# Patient Record
Sex: FEMALE | Race: WHITE
[De-identification: names, ages, dates, MRNs, and addresses within clinical notes are randomized per-mention and may not be internally consistent; named-entity substitution may affect disease eponyms.]

---

## 2019-07-18 ENCOUNTER — HOSPITAL ENCOUNTER (OUTPATIENT)
Dept: HOSPITAL 52 - LL.SDS | Age: 49
Discharge: NURSING, REHAB OR LONG-TERM CARE | End: 2019-07-18
Attending: SURGERY
Payer: COMMERCIAL

## 2019-07-18 ENCOUNTER — HOSPITAL ENCOUNTER (INPATIENT)
Dept: HOSPITAL 7 - FB.MS | Age: 49
LOS: 5 days | Discharge: HOME | End: 2019-07-23
Payer: COMMERCIAL

## 2019-07-18 DIAGNOSIS — K91.71: Primary | ICD-10-CM

## 2019-07-18 DIAGNOSIS — Z98.890: ICD-10-CM

## 2019-07-18 DIAGNOSIS — M79.641: ICD-10-CM

## 2019-07-18 DIAGNOSIS — E66.9: ICD-10-CM

## 2019-07-18 DIAGNOSIS — M79.642: ICD-10-CM

## 2019-07-18 DIAGNOSIS — Z87.891: ICD-10-CM

## 2019-07-18 DIAGNOSIS — E06.3: ICD-10-CM

## 2019-07-18 DIAGNOSIS — E78.00: ICD-10-CM

## 2019-07-18 DIAGNOSIS — Z79.1: ICD-10-CM

## 2019-07-18 DIAGNOSIS — E03.8: ICD-10-CM

## 2019-07-18 DIAGNOSIS — Z12.11: Primary | ICD-10-CM

## 2019-07-18 DIAGNOSIS — D12.0: ICD-10-CM

## 2019-07-18 DIAGNOSIS — Z79.899: ICD-10-CM

## 2019-07-18 LAB
CHLORIDE SERPL-SCNC: 106 MMOL/L (ref 98–107)
SODIUM SERPL-SCNC: 141 MMOL/L (ref 136–145)

## 2019-07-18 PROCEDURE — 45385 COLONOSCOPY W/LESION REMOVAL: CPT

## 2019-07-18 PROCEDURE — 80048 BASIC METABOLIC PNL TOTAL CA: CPT

## 2019-07-18 PROCEDURE — 85025 COMPLETE CBC W/AUTO DIFF WBC: CPT

## 2019-07-18 PROCEDURE — 36415 COLL VENOUS BLD VENIPUNCTURE: CPT

## 2019-07-18 PROCEDURE — 74019 RADEX ABDOMEN 2 VIEWS: CPT

## 2019-07-18 PROCEDURE — 0DQH0ZZ REPAIR CECUM, OPEN APPROACH: ICD-10-PCS

## 2019-07-18 NOTE — PCM.PN
- General Info


Date of Service: 07/18/19





- Review of Systems


Systems Review Comment:: 





49-year-old female referred for her initial screening colonoscopy. She denies 

any recent change in bowel pattern and also denies any family history of colon 

cancer. I have discussed the proposed colonoscopy with the patient. Risks such 

as but not limited to bleeding and GI injury reviewed. She agrees to proceed. 

Her recent history and physical is reviewed and no significant changes are 

noted.





- Patient Data


Vitals - Most Recent: 


 Last Vital Signs











Temp  96.8 F   07/18/19 11:37


 


Pulse  77   07/18/19 11:37


 


Resp  16   07/18/19 11:37


 


BP  122/85   07/18/19 11:37


 


Pulse Ox  97   07/18/19 11:37











Weight - Most Recent: 96.615 kg


Med Orders - Current: 


 Current Medications





Lactated Ringer's (Ringers, Lactated)  1,000 mls @ 125 mls/hr IV ASDIRECTED [facility]


   Last Admin: 07/18/19 11:42 Dose:  125 mls/hr


Sodium Chloride (Saline Flush)  10 ml FLUSH ASDIRECTED PRN


   PRN Reason: Keep Vein Open





Discontinued Medications





Propofol (Diprivan  20 Ml) Confirm Administered Dose 400 mg .ROUTE .STK-MED ONE


   Stop: 07/18/19 11:27











- Problem List Review


Problem List Initiated/Reviewed/Updated: Yes





- My Orders


Last 24 Hours: 


My Active Orders





07/18/19 11:30


Patient Status [ADT] Routine 


Peripheral IV Care [RC] .AS DIRECTED 


Verify Patient Consent Obtain [RC] ASDIRECTED 


Lactated Ringers [Ringers, Lactated] 1,000 ml IV ASDIRECTED 


Sodium Chloride 0.9% [Saline Flush]   10 ml FLUSH ASDIRECTED PRN 


Peripheral IV Insertion Adult [OM.PC] Routine 














- Assessment


Assessment:: 





Colon Cancer Screening





- Plan


Plan:: 





Colonoscopy

## 2019-07-18 NOTE — PCM.PN
- General Info


Date of Service: 07/18/19





- Review of Systems


Systems Review Comment:: 





See Dictated Note





- Patient Data


Vitals - Most Recent: 


 Last Vital Signs











Temp  96.7 F   07/18/19 14:30


 


Pulse  71   07/18/19 16:32


 


Resp  16   07/18/19 16:32


 


BP  94/63   07/18/19 16:32


 


Pulse Ox  100   07/18/19 16:32











Weight - Most Recent: 96.615 kg


I&O - Last 24 Hours: 


 Intake & Output











 07/18/19 07/18/19 07/18/19





 06:59 14:59 22:59


 


Output Total  0 


 


Balance  0 











Med Orders - Current: 


 Current Medications





Lactated Ringer's (Ringers, Lactated)  1,000 mls @ 125 mls/hr IV ASDIRECTED [facility]


   Last Admin: 07/18/19 18:02 Dose:  125 mls/hr


Morphine Sulfate (Morphine)  2 mg IVPUSH ONETIME ONE


   Stop: 07/18/19 18:07


Ondansetron HCl (Zofran)  4 mg IVPUSH ONETIME ONE


   Stop: 07/18/19 18:07


Sodium Chloride (Saline Flush)  10 ml FLUSH ASDIRECTED PRN


   PRN Reason: Keep Vein Open





Discontinued Medications





Ondansetron HCl (Zofran)  4 mg IVPUSH ONETIME ONE


   Stop: 07/18/19 14:18


   Last Admin: 07/18/19 14:21 Dose:  4 mg


Propofol (Diprivan  20 Ml) Confirm Administered Dose 400 mg .ROUTE .STK-MED ONE


   Stop: 07/18/19 11:27











- Problem List Review


Problem List Initiated/Reviewed/Updated: Yes





- My Orders


Last 24 Hours: 


My Active Orders





07/18/19 11:30


Patient Status [ADT] Routine 


Peripheral IV Care [RC] .AS DIRECTED 


Verify Patient Consent Obtain [RC] ASDIRECTED 


Lactated Ringers [Ringers, Lactated] 1,000 ml IV ASDIRECTED 


Sodium Chloride 0.9% [Saline Flush]   10 ml FLUSH ASDIRECTED PRN 


Peripheral IV Insertion Adult [OM.PC] Routine 





07/18/19 13:56


Ready for Discharge [RC] PER UNIT ROUTINE 





07/18/19 17:27


Abdomen 2V AP Flat Upright [CR] Routine 





07/18/19 18:06


Morphine   2 mg IVPUSH ONETIME ONE 


Ondansetron [Zofran]   4 mg IVPUSH ONETIME ONE 














- Assessment


Assessment:: 





Colon Perforation from Colonoscopy





- Plan


Plan:: 





Transfer to [facility] in [place] for Exploratory Laparotomy and repair of 

colon perforation.

## 2019-07-18 NOTE — OR
Date of Procedure:  07/18/2019

 

PREOPERATIVE DIAGNOSIS:  Colon cancer screening.

 

POSTOPERATIVE DIAGNOSIS:  Colon polyp.

 

OPERATION PERFORMED:  Colonoscopy with polypectomy.

 

INDICATIONS FOR SURGERY:  This 49-year-old female was referred for initial

screening colonoscopy.  She denies any recent change in bowel habits or history

of rectal bleeding.

 

FINDINGS:  The patient has a single sessile polyp noted in the cecum.  It is

approximately 2 cm in size.  It is soft.  The remainder of the colon lining

appeared normal.

 

DESCRIPTION OF PROCEDURE:  The patient was taken to the operating room.  She was

given intravenous sedation, and with her in the left lateral decubitus position,

digital rectal exam was performed showing no rectal masses.  The Olympus

colonoscope was inserted into the rectum.  Retroflexed examination of the rectal

canal was performed.  The scope was then carefully advanced under direct

visualization through the entire length of the colon until the cecum was

reached.  Cecal acquisition was confirmed by noting the normal internal cecal

anatomy including the appendiceal orifice and ileocecal valve.  In the cecum,

the above-described polyp was identified.  This was removed with a cautery

snare.  The polyp was able to be suctioned and retrieved into a polyp trap.

Careful inspection of the polypectomy site was carried out.  There was some

cautery artifact noted at the polypectomy site, but the site appeared to be

intact.  There was no indication of any bleeding or sign of any complication at

the polypectomy site.  The scope was then slowly withdrawn sequentially re-

examining the colonic segments until the entire colon and rectum have been fully

examined.  Attempt was made to remove as much air from the colon as possible.

The scope was removed and the patient was taken from the operating room in

satisfactory condition.

 

ESTIMATED BLOOD LOSS:  0.

 

COMPLICATIONS:  None.

 

PROGNOSIS:  Good.

 

RON Borrero MD

DD:  07/18/2019 15:39:43

DT:  07/18/2019 18:42:14

Job #:  [id number]

## 2019-07-18 NOTE — PCM.OPNOTE
- General Post-Op/Procedure Note


Date of Surgery/Procedure: 07/18/19


Operative Procedure(s): Closure of colon perforation


Findings: 





localized perforation in cecum with minimal surrounding inflammation and 

minimal intraperitoneal contamination


Pre Op Diagnosis: Colon Perforation


Post-Op Diagnosis: Same


Anesthesia Technique: General ET Tube


Primary Surgeon: Brendan Borrero


Assistant: Mohs,Thomas J


Reason Assistant Was Necessary: 





Assist in exposure and tissue control and to improve efficiency


Pathology: 





Perforation site of cecum


EBL in mLs: 50


Complications: None


Condition: Good

## 2019-07-18 NOTE — PCM.OPNOTE
- General Post-Op/Procedure Note


Date of Surgery/Procedure: 07/18/19


Operative Procedure(s): Colonoscopy with Polypectomy


Findings: 





2 cm Cecal Polyp


Pre Op Diagnosis: Colon cancer screening


Post-Op Diagnosis: Colonoscopy with polypectomy


Anesthesia Technique: MAC


Primary Surgeon: Brendan Borrero


Pathology: 





Cecal polyp


Output, Urine Amount: 0


EBL in mLs: 0


Complications: None


Condition: Good

## 2019-07-19 RX ADMIN — KETOROLAC TROMETHAMINE SCH MG: 30 INJECTION, SOLUTION INTRAMUSCULAR at 16:45

## 2019-07-19 RX ADMIN — Medication PRN ML: at 16:51

## 2019-07-19 RX ADMIN — KETOROLAC TROMETHAMINE SCH MG: 30 INJECTION, SOLUTION INTRAMUSCULAR at 23:06

## 2019-07-19 NOTE — OR
DATE OF OPERATION:  07/18/2019

 

SURGEON:  Brendan Borrero MD

 

FIRST ASSISTANT:  Dr. Thomas Mohs.  Assistant needed to assist in exposure and

tissue manipulation as well as improve efficiency.

 

PREOPERATIVE DIAGNOSIS:  Colon perforation post colonoscopy.

 

POSTOPERATIVE DIAGNOSIS:  Colon perforation post colonoscopy.

 

OPERATION PERFORMED:  Exploratory laparotomy with closure of colon perforation.

 

INDICATIONS FOR SURGERY:  This 49-year-old female underwent a screening

colonoscopy earlier today.  She had a large cecal polyp, which was removed with

a cautery snare.  No other abnormalities were noted during her exam.

Postoperatively, she had symptoms of nausea and abdominal pain, and a

postprocedure x-ray revealed free intraperitoneal air indicating colon

perforation.

 

FINDINGS:  In the patient's cecum on the antimesenteric border, the patient had

a small localized perforation.  There was

minimal inflammation noted in the area and a minimal amount of intraabdominal

contamination.  The adjacent cecum all appeared normal.

 

PROCEDURE IN DETAIL:  The patient was taken to the operating room.  She was

given general endotracheal anesthesia.  The abdomen was sterilely prepped and

draped.  A vertical midline abdominal incision was made and carried through the

midline of the fascia into the peritoneal cavity.  Gentle manipulation isolated

the cecum, which was able to be brought up into and clearly visualized through

the midline incision, the site of the perforation was identified.  The edges of

the perforation were secured with care being used to assure that the mucosa was

secured in the Allis clamps.  The perforation was sealed with Allis clamps

transversely and then a TA-55 stapler was fired across the site of the

perforation closing it.  The excess tissue from the clamp was sharply excised,

which included the edges of the perforation and this tissue was submitted for

pathologic evaluation.  The staple line was carefully inspected, appeared to be

intact and without any complication.  There was no indication of any stenosis of

the cecum, and the staple line was well away from the ileocecal valve and the

appendix.  Interrupted 3-0 Vicryl inverting sutures were then used to invert the

staple line giving additional reinforcement to this closure site.  Inspection of

the remainder of the bowel in this region showed no sign of any injury and then

copious irrigation with multiple liters of saline was carried out irrigating all

quadrants of the abdominal cavity, clearing any small amount of contamination.

Once the irrigant was returning clear, the wound was closed approximating the

midline fascia with running #2 Prolene, and after copiously irrigating the

subcutaneous layer, the skin was closed with skin staples.  Sterile dressing was

placed.  The patient was awakened, extubated, and taken from the operating room

in satisfactory condition.

 

ESTIMATED BLOOD LOSS:  50 mL.

 

COMPLICATIONS:  None.

 

PROGNOSIS:  Good.

 

Job#: [id number]

DD: 07/18/2019 2211

DT: 07/19/2019 0207 JAX/RON MCCOY

## 2019-07-19 NOTE — PCM.SURGPN
- General Info


Date of Service: 07/19/19


Date of Surgery/Procedure: 07/18/19


POD#: 1


Post-Op Diagnosis: Colon Perforation


Functional Status: Reports: Pain Controlled (Only incisional pain, denies pain 

in abdomen)





- Review of Systems


Pulmonary: Reports: No Symptoms


Gastrointestinal: Denies: Flatus, Nausea, Vomiting


Musculoskeletal: Denies: Leg Pain





- Patient Data


Vitals - Most Recent: 


 Last Vital Signs











Temp  99.0 F   07/19/19 02:25


 


Pulse  68   07/19/19 02:25


 


Resp  17   07/19/19 02:25


 


BP  114/77   07/19/19 02:25


 


Pulse Ox  95   07/19/19 03:09











Weight - Most Recent: 220 lb 8 oz


I&O - Last 24 Hours: 


 Intake & Output











 07/18/19 07/19/19 07/19/19





 22:59 06:59 14:59


 


Intake Total  858 


 


Output Total 400  


 


Balance -400 858 











Lab Results Last 24 Hrs: 


 Laboratory Results - last 24 hr











  07/19/19 07/19/19 Range/Units





  06:15 06:15 


 


WBC  17.5 H   (4.5-12.0)  X10-3/uL


 


RBC  4.66   (3.23-5.20)  x10(6)uL


 


Hgb  14.4   (11.5-15.5)  g/dL


 


Hct  42.8   (30.0-51.3)  %


 


MCV  91.9   (80-96)  fL


 


MCH  30.8   (27.7-33.6)  pg


 


MCHC  33.5   (32.2-35.4)  g/dL


 


RDW  12.4   (11.5-15.5)  %


 


Plt Count  217   (125-369)  X10(3)uL


 


MPV  11.5 H   (7.4-10.4)  fL


 


Add Manual Diff  Yes   


 


Neutrophils % (Manual)  90 H   (46-82)  %


 


Band Neutrophils %  4   (0-6)  %


 


Lymphocytes % (Manual)  3 L   (13-37)  %


 


Monocytes % (Manual)  3 L   (4-12)  %


 


Sodium   142  (135-145)  mmol/L


 


Potassium   4.0  (3.5-5.3)  mmol/L


 


Chloride   108  (100-110)  mmol/L


 


Carbon Dioxide   25  (21-32)  mmol/L


 


BUN   9  (7-18)  mg/dL


 


Creatinine   1.0  (0.55-1.02)  mg/dL


 


Est Cr Clr Drug Dosing   60.56  mL/min


 


Estimated GFR (MDRD)   59 L  (>60)  


 


BUN/Creatinine Ratio   9.0  (9-20)  


 


Glucose   119 H  ()  mg/dL


 


Calcium   8.2 L  (8.6-10.2)  mg/dL


 


Total Bilirubin   0.5  (0.1-1.3)  mg/dL


 


AST   13  (5-25)  IU/L


 


ALT   20  (12-36)  U/L


 


Alkaline Phosphatase   86  ()  IU/L


 


Total Protein   5.9 L  (6.0-8.0)  g/dL


 


Albumin   2.8 L  (3.5-5.2)  g/dL


 


Globulin   3.1  g/dL


 


Albumin/Globulin Ratio   0.9  











Med Orders - Current: 


 Current Medications





Enoxaparin Sodium (Lovenox)  30 mg SUBCUT DAILY [facility]


Lactated Ringer's (Ringers, Lactated)  1,000 mls @ 125 mls/hr IV ASDIRECTED [facility]


   Last Admin: 07/19/19 02:43 Dose:  125 mls/hr


Piperacillin Sod/Tazobactam (Sod 3.375 gm/ Sodium Chloride)  50 mls @ 100 mls/

hr IV Q6H [facility]


   Last Admin: 07/19/19 01:51 Dose:  100 mls/hr


Lactated Ringer's (Ringers, Lactated)  1,000 mls @ 125 mls/hr IV ASDIRECTED [facility]


Ketorolac Tromethamine (Toradol)  30 mg IVPUSH Q6H [facility]


Morphine Sulfate (Morphine)  2 mg IVPUSH Q1H PRN


   PRN Reason: Pain (severe 7-10)


   Last Admin: 07/19/19 05:59 Dose:  2 mg


Morphine Sulfate (Morphine Pca 30 Mg In 30 Ml)  0 mg IV ASDIRECTED [facility]; Protocol


Ondansetron HCl (Zofran)  4 mg IVPUSH Q6H PRN


   PRN Reason: Nausea/Vomiting





Discontinued Medications





Sodium Chloride (Normal Saline)  250 mls @ 250 mls/hr IV ASDIRECTED [facility]


   Stop: 07/18/19 20:59


   Last Admin: 07/18/19 20:29 Dose:  250 mls/hr


Piperacillin Sod/Tazobactam (Sod 3.375 gm/ Sodium Chloride)  50 mls @ 100 mls/

hr IV Q6H [facility]


   Last Admin: 07/18/19 23:16 Dose:  Not Given


Piperacillin Sod/Tazobactam Sod (Zosyn) Confirm Administered Dose 3.375 gm 

.ROUTE .STK-MED ONE


   Stop: 07/18/19 20:13


   Last Admin: 07/18/19 20:18 Dose:  Not Given











- Exam


Wound/Incisions: Dressing Dry and Intact


General: Alert, Oriented


Lungs: Normal Respiratory Effort


GI/Abdominal Exam: Soft, No Distention, Tender (only near incision)


Extremities: Non-Tender





- Problem List Review


Problem List Initiated/Reviewed/Updated: Yes





- My Orders


Last 24 Hours: 


 Active Orders 24 hr











 Category Date Time Status


 


 Patient Status [ADT] Routine ADT  07/18/19 19:58 Active


 


 Patient Status [ADT] Routine ADT  07/18/19 21:51 Active


 


 Ambulate [RC] ASDIRECTED Care  07/18/19 21:51 Active


 


 Antiembolic Devices [RC] .Routine Care  07/18/19 21:58 Active


 


 DC Castro Catheter [Urinary Catheter Removal] [RC] Per Care  07/19/19 07:12 

Ordered





 Unit Routine   


 


 Insert Castro Catheter [Insert Urinary Catheter] [OM.PC] Care  07/18/19 20:15 

Ordered





 Q24H   


 


 Intake and Output [RC] QSHIFT Care  07/18/19 21:52 Active


 


 Oxygen Therapy [RC] PRN Care  07/18/19 21:51 Active


 


 RT Incentive Spirometry [RC] Q1HWA Care  07/18/19 21:51 Active


 


 VTE/DVT Education [RC] Click to Edit Care  07/18/19 21:58 Active


 


 Vital Signs [RC] PER UNIT ROUTINE Care  07/18/19 21:51 Active


 


 Nothing Per Oral Diet [DIET] Diet  07/18/19 Dinner Active


 


 CBC WITH MANUAL DIFF [HEME] AM Lab  07/20/19 05:11 Ordered


 


 Enoxaparin [Lovenox] Med  07/19/19 09:00 Ordered





 30 mg SUBCUT DAILY   


 


 Ketorolac [Toradol] Med  07/19/19 07:15 Ordered





 30 mg IVPUSH Q6H   


 


 Lactated Ringers [Ringers, Lactated] 1,000 ml Med  07/18/19 20:00 Active





 IV ASDIRECTED   


 


 Lactated Ringers [Ringers, Lactated] 1,000 ml Med  07/18/19 22:00 Active





 IV ASDIRECTED   


 


 Morphine Med  07/18/19 21:51 Active





 2 mg IVPUSH Q1H PRN   


 


 Morphine PF [Morphine PCA 30 MG in 30 ML] Med  07/18/19 22:00 Active





 See Protocol  IV ASDIRECTED   


 


 Ondansetron [Zofran] Med  07/18/19 21:58 Active





 4 mg IVPUSH Q6H PRN   


 


 Piperacillin/Tazobactam [Zosyn] 3.375 gm Med  07/18/19 20:00 Active





 Sodium Chloride 0.9% [Normal Saline] 50 ml   





 IV Q6H   


 


 DVT/VTE Prophylaxis Reflex [OM.PC] Per Unit Routine Oth  07/18/19 21:58 Ordered


 


 Sequential Compression Device [OM.PC] Routine Oth  07/18/19 19:58 Ordered


 


 Resuscitation Status Routine Resus Stat  07/18/19 19:58 Ordered








 Medication Orders





Enoxaparin Sodium (Lovenox)  30 mg SUBCUT DAILY [facility]


Lactated Ringer's (Ringers, Lactated)  1,000 mls @ 125 mls/hr IV ASDIRECTED [facility]


   Last Admin: 07/19/19 02:43  Dose: 125 mls/hr


Piperacillin Sod/Tazobactam (Sod 3.375 gm/ Sodium Chloride)  50 mls @ 100 mls/

hr IV Q6H [facility]


   Last Admin: 07/19/19 01:51  Dose: 100 mls/hr


   Admin: 07/18/19 20:29  Dose: 100 mls/hr


Lactated Ringer's (Ringers, Lactated)  1,000 mls @ 125 mls/hr IV ASDIRECTED [facility]


Ketorolac Tromethamine (Toradol)  30 mg IVPUSH Q6H [facility]


Morphine Sulfate (Morphine)  2 mg IVPUSH Q1H PRN


   PRN Reason: Pain (severe 7-10)


   Last Admin: 07/19/19 05:59  Dose: 2 mg


Morphine Sulfate (Morphine Pca 30 Mg In 30 Ml)  0 mg IV ASDIRECTED [facility]; Protocol


Ondansetron HCl (Zofran)  4 mg IVPUSH Q6H PRN


   PRN Reason: Nausea/Vomiting











- Assessment


Assessment           (Free Text/Narrative):: 





POD#1 Repair of Colon Perforation - stable


WBC remain elevated





- Plan


Plan                        (Free Text/Narrative):: 





DC Castro


Activity encouraged


continue NPO except ice chips


continue IV antibiotics


start Torodal and Lovenox

## 2019-07-19 NOTE — HP
ADMISSION DATE:  07/18/2019

 

This 49-year-old female underwent colonoscopy earlier today in [place].  At that

time, a polyp was removed from her cecum.  Her colon otherwise appeared normal.

Postoperatively, she continued to have abdominal pain and postprocedure

abdominal x-ray has revealed free intraperitoneal air indicating a perforation

likely at the polypectomy site.  I have recommended exploratory laparotomy for

closure of this colon perforation.  This has been discussed with the patient and

her family.  We reviewed options of treatment and location, and she has agreed

to undergo this laparotomy at the [facility] for repair of this colon

perforation.  She has been advised of the risks and agrees to proceed

understanding these.

 

Job#: [id number]

DD: 07/18/2019 2051

DT: 07/19/2019 0101 JAX/RON

## 2019-07-19 NOTE — PCM.SURGPN
- General Info


Date of Service: 07/19/19


Date of Surgery/Procedure: 07/18/19


POD#: 1


Post-Op Diagnosis: Colon Perforation


Functional Status: Reports: Pain Controlled (only notes tightness in area of 

incision)





- Review of Systems


Pulmonary: Denies: Shortness of Breath


Gastrointestinal: Denies: Flatus


Genitourinary: Reports: Other (able to void without dobbins)





- Patient Data


Vitals - Most Recent: 


 Last Vital Signs











Temp  99.0 F   07/19/19 02:25


 


Pulse  68   07/19/19 02:25


 


Resp  17   07/19/19 02:25


 


BP  114/77   07/19/19 02:25


 


Pulse Ox  100   07/19/19 10:50











Weight - Most Recent: 220 lb 8 oz


I&O - Last 24 Hours: 


 Intake & Output











 07/19/19 07/19/19 07/19/19





 06:59 14:59 22:59


 


Intake Total 858  


 


Balance 858  











Lab Results Last 24 Hrs: 


 Laboratory Results - last 24 hr











  07/19/19 07/19/19 Range/Units





  06:15 06:15 


 


WBC  17.5 H   (4.5-12.0)  X10-3/uL


 


RBC  4.66   (3.23-5.20)  x10(6)uL


 


Hgb  14.4   (11.5-15.5)  g/dL


 


Hct  42.8   (30.0-51.3)  %


 


MCV  91.9   (80-96)  fL


 


MCH  30.8   (27.7-33.6)  pg


 


MCHC  33.5   (32.2-35.4)  g/dL


 


RDW  12.4   (11.5-15.5)  %


 


Plt Count  217   (125-369)  X10(3)uL


 


MPV  11.5 H   (7.4-10.4)  fL


 


Add Manual Diff  Yes   


 


Neutrophils % (Manual)  90 H   (46-82)  %


 


Band Neutrophils %  4   (0-6)  %


 


Lymphocytes % (Manual)  3 L   (13-37)  %


 


Monocytes % (Manual)  3 L   (4-12)  %


 


Sodium   142  (135-145)  mmol/L


 


Potassium   4.0  (3.5-5.3)  mmol/L


 


Chloride   108  (100-110)  mmol/L


 


Carbon Dioxide   25  (21-32)  mmol/L


 


BUN   9  (7-18)  mg/dL


 


Creatinine   1.0  (0.55-1.02)  mg/dL


 


Est Cr Clr Drug Dosing   60.56  mL/min


 


Estimated GFR (MDRD)   59 L  (>60)  


 


BUN/Creatinine Ratio   9.0  (9-20)  


 


Glucose   119 H  ()  mg/dL


 


Calcium   8.2 L  (8.6-10.2)  mg/dL


 


Total Bilirubin   0.5  (0.1-1.3)  mg/dL


 


AST   13  (5-25)  IU/L


 


ALT   20  (12-36)  U/L


 


Alkaline Phosphatase   86  ()  IU/L


 


Total Protein   5.9 L  (6.0-8.0)  g/dL


 


Albumin   2.8 L  (3.5-5.2)  g/dL


 


Globulin   3.1  g/dL


 


Albumin/Globulin Ratio   0.9  











Med Orders - Current: 


 Current Medications





Enoxaparin Sodium (Lovenox)  30 mg SUBCUT DAILY [facility]


   Last Admin: 07/19/19 09:50 Dose:  30 mg


Lactated Ringer's (Ringers, Lactated)  1,000 mls @ 125 mls/hr IV ASDIRECTED [facility]


   Last Admin: 07/19/19 09:44 Dose:  125 mls/hr


Piperacillin Sod/Tazobactam (Sod 3.375 gm/ Sodium Chloride)  50 mls @ 100 mls/

hr IV Q6H [facility]


   Last Admin: 07/19/19 14:12 Dose:  100 mls/hr


Lactated Ringer's (Ringers, Lactated)  1,000 mls @ 125 mls/hr IV ASDIRECTED [facility]


Ketorolac Tromethamine (Toradol)  30 mg IVPUSH Q6H [facility]


   Stop: 07/24/19 04:01


Morphine Sulfate (Morphine)  2 mg IVPUSH Q1H PRN


   PRN Reason: Pain (severe 7-10)


   Last Admin: 07/19/19 05:59 Dose:  2 mg


Morphine Sulfate (Morphine Pca 30 Mg In 30 Ml)  0 mg IV ASDIRECTED [facility]; Protocol


Ondansetron HCl (Zofran)  4 mg IVPUSH Q6H PRN


   PRN Reason: Nausea/Vomiting


Sodium Chloride (Saline Flush)  10 ml FLUSH ASDIRECTED PRN


   PRN Reason: IV Use





Discontinued Medications





Sodium Chloride (Normal Saline)  250 mls @ 250 mls/hr IV ASDIRECTED [facility]


   Stop: 07/18/19 20:59


   Last Admin: 07/18/19 20:29 Dose:  250 mls/hr


Piperacillin Sod/Tazobactam (Sod 3.375 gm/ Sodium Chloride)  50 mls @ 100 mls/

hr IV Q6H [facility]


   Last Admin: 07/18/19 23:16 Dose:  Not Given


Ketorolac Tromethamine (Toradol)  30 mg IVPUSH Q6H [facility]


   Stop: 07/24/19 01:31


   Last Admin: 07/19/19 09:50 Dose:  30 mg


Piperacillin Sod/Tazobactam Sod (Zosyn) Confirm Administered Dose 3.375 gm 

.ROUTE .STK-MED ONE


   Stop: 07/18/19 20:13


   Last Admin: 07/18/19 20:18 Dose:  Not Given











- Problem List Review


Problem List Initiated/Reviewed/Updated: Yes





- My Orders


Last 24 Hours: 


 Active Orders 24 hr











 Category Date Time Status


 


 Patient Status [ADT] Routine ADT  07/18/19 19:58 Active


 


 Patient Status [ADT] Routine ADT  07/18/19 21:51 Active


 


 Ambulate [RC] ASDIRECTED Care  07/18/19 21:51 Active


 


 Antiembolic Devices [RC] .Routine Care  07/18/19 21:58 Active


 


 DC Dobbins Catheter [Urinary Catheter Removal] [RC] Per Care  07/19/19 07:12 

Active





 Unit Routine   


 


 Insert Dobbins Catheter [Insert Urinary Catheter] [OM.PC] Care  07/18/19 20:15 

Ordered





 Q24H   


 


 Intake and Output [RC] QSHIFT Care  07/18/19 21:52 Active


 


 Oxygen Therapy [RC] PRN Care  07/18/19 21:51 Active


 


 RT Incentive Spirometry [RC] Q1HWA Care  07/18/19 21:51 Active


 


 VTE/DVT Education [RC] Click to Edit Care  07/18/19 21:58 Active


 


 Vital Signs [RC] PER UNIT ROUTINE Care  07/18/19 21:51 Active


 


 Nothing Per Oral Diet [DIET] Diet  07/18/19 Dinner Active


 


 CBC WITH MANUAL DIFF [HEME] AM Lab  07/20/19 05:11 Ordered


 


 Enoxaparin [Lovenox] Med  07/19/19 09:00 Active





 30 mg SUBCUT DAILY   


 


 Ketorolac [Toradol] Med  07/19/19 16:00 Active





 30 mg IVPUSH Q6H   


 


 Lactated Ringers [Ringers, Lactated] 1,000 ml Med  07/18/19 20:00 Active





 IV ASDIRECTED   


 


 Lactated Ringers [Ringers, Lactated] 1,000 ml Med  07/18/19 22:00 Active





 IV ASDIRECTED   


 


 Morphine Med  07/18/19 21:51 Active





 2 mg IVPUSH Q1H PRN   


 


 Morphine PF [Morphine PCA 30 MG in 30 ML] Med  07/18/19 22:00 Active





 See Protocol  IV ASDIRECTED   


 


 Ondansetron [Zofran] Med  07/18/19 21:58 Active





 4 mg IVPUSH Q6H PRN   


 


 Piperacillin/Tazobactam [Zosyn] 3.375 gm Med  07/18/19 20:00 Active





 Sodium Chloride 0.9% [Normal Saline] 50 ml   





 IV Q6H   


 


 Sodium Chloride 0.9% [Saline Flush] Med  07/19/19 14:51 Active





 10 ml FLUSH ASDIRECTED PRN   


 


 DVT/VTE Prophylaxis Reflex [OM.PC] Per Unit Routine Oth  07/18/19 21:58 Ordered


 


 Sequential Compression Device [OM.PC] Routine Oth  07/18/19 19:58 Ordered


 


 Resuscitation Status Routine Resus Stat  07/18/19 19:58 Ordered








 Medication Orders





Enoxaparin Sodium (Lovenox)  30 mg SUBCUT DAILY [facility]


   Last Admin: 07/19/19 09:50  Dose: 30 mg


Lactated Ringer's (Ringers, Lactated)  1,000 mls @ 125 mls/hr IV ASDIRECTED [facility]


   Last Admin: 07/19/19 09:44  Dose: 125 mls/hr


   Infusion: 07/19/19 09:44  Dose: 125 mls/hr


   Admin: 07/19/19 02:43  Dose: 125 mls/hr


Piperacillin Sod/Tazobactam (Sod 3.375 gm/ Sodium Chloride)  50 mls @ 100 mls/

hr IV Q6H [facility]


   Last Admin: 07/19/19 14:12  Dose: 100 mls/hr


   Admin: 07/19/19 08:29  Dose: 100 mls/hr


   Admin: 07/19/19 01:51  Dose: 100 mls/hr


   Admin: 07/18/19 20:29  Dose: 100 mls/hr


Lactated Ringer's (Ringers, Lactated)  1,000 mls @ 125 mls/hr IV ASDIRECTED [facility]


Ketorolac Tromethamine (Toradol)  30 mg IVPUSH Q6H [facility]


   Stop: 07/24/19 04:01


Morphine Sulfate (Morphine)  2 mg IVPUSH Q1H PRN


   PRN Reason: Pain (severe 7-10)


   Last Admin: 07/19/19 05:59  Dose: 2 mg


Morphine Sulfate (Morphine Pca 30 Mg In 30 Ml)  0 mg IV ASDIRECTED [facility]; Protocol


Ondansetron HCl (Zofran)  4 mg IVPUSH Q6H PRN


   PRN Reason: Nausea/Vomiting


Sodium Chloride (Saline Flush)  10 ml FLUSH ASDIRECTED PRN


   PRN Reason: IV Use











- Assessment


Assessment           (Free Text/Narrative):: 





POD#1 repair of colon perforation





- Plan


Plan                        (Free Text/Narrative):: 





Continue current care


Encourage deep breathing and ambulation

## 2019-07-20 RX ADMIN — KETOROLAC TROMETHAMINE SCH MG: 30 INJECTION, SOLUTION INTRAMUSCULAR at 03:46

## 2019-07-20 RX ADMIN — KETOROLAC TROMETHAMINE SCH MG: 30 INJECTION, SOLUTION INTRAMUSCULAR at 16:42

## 2019-07-20 RX ADMIN — KETOROLAC TROMETHAMINE SCH MG: 30 INJECTION, SOLUTION INTRAMUSCULAR at 10:09

## 2019-07-20 RX ADMIN — KETOROLAC TROMETHAMINE SCH MG: 30 INJECTION, SOLUTION INTRAMUSCULAR at 21:18

## 2019-07-20 RX ADMIN — Medication PRN ML: at 07:45

## 2019-07-20 RX ADMIN — Medication PRN ML: at 14:37

## 2019-07-21 RX ADMIN — KETOROLAC TROMETHAMINE SCH MG: 30 INJECTION, SOLUTION INTRAMUSCULAR at 21:37

## 2019-07-21 RX ADMIN — Medication PRN ML: at 20:13

## 2019-07-21 RX ADMIN — KETOROLAC TROMETHAMINE SCH MG: 30 INJECTION, SOLUTION INTRAMUSCULAR at 16:19

## 2019-07-21 RX ADMIN — Medication PRN ML: at 14:23

## 2019-07-21 RX ADMIN — Medication PRN ML: at 07:35

## 2019-07-21 RX ADMIN — KETOROLAC TROMETHAMINE SCH MG: 30 INJECTION, SOLUTION INTRAMUSCULAR at 09:33

## 2019-07-21 RX ADMIN — KETOROLAC TROMETHAMINE SCH MG: 30 INJECTION, SOLUTION INTRAMUSCULAR at 03:49

## 2019-07-21 NOTE — PCM.SURGPN
- General Info


Date of Service: 07/21/19


Date of Surgery/Procedure: 07/18/19


POD#: 3


Post-Op Diagnosis: Colon perforation


Functional Status: Reports: Pain Controlled (some gas pain this am)





- Review of Systems


Pulmonary: Denies: Shortness of Breath (Doing better with IS)


Gastrointestinal: Denies: Flatus (belching)


Genitourinary: Reports: No Symptoms


Musculoskeletal: Reports: Other (Mild right chest wall pain from previous injury

).  Denies: Leg Pain





- Patient Data


Vitals - Most Recent: 


 Last Vital Signs











Temp  99.2 F   07/21/19 04:00


 


Pulse  94   07/21/19 04:00


 


Resp  18   07/21/19 04:00


 


BP  124/68   07/21/19 04:00


 


Pulse Ox  91 L  07/21/19 04:00











Weight - Most Recent: 220 lb 8 oz


I&O - Last 24 Hours: 


 Intake & Output











 07/20/19 07/20/19 07/21/19





 14:59 22:59 06:59


 


Intake Total 900  


 


Output Total 600 400 


 


Balance 300 -400 











Lab Results Last 24 Hrs: 


 Laboratory Results - last 24 hr











  07/20/19 Range/Units





  06:10 


 


WBC  13.2 H  (4.5-12.0)  X10-3/uL


 


RBC  4.06  (3.23-5.20)  x10(6)uL


 


Hgb  12.9  (11.5-15.5)  g/dL


 


Hct  37.2  (30.0-51.3)  %


 


MCV  91.7  (80-96)  fL


 


MCH  31.8  (27.7-33.6)  pg


 


MCHC  34.7  (32.2-35.4)  g/dL


 


RDW  12.6  (11.5-15.5)  %


 


Plt Count  170  (125-369)  X10(3)uL


 


MPV  11.1 H  (7.4-10.4)  fL


 


Neutrophils % (Manual)  90 H  (46-82)  %


 


Lymphocytes % (Manual)  6 L  (13-37)  %


 


Monocytes % (Manual)  4  (4-12)  %











Med Orders - Current: 


 Current Medications





Albuterol/Ipratropium (Duoneb 3.0-0.5 Mg/3 Ml)  3 ml NEB QIDRT [facility]


   Last Admin: 07/21/19 06:03 Dose:  3 ml


Enoxaparin Sodium (Lovenox)  30 mg SUBCUT DAILY [facility]


   Last Admin: 07/20/19 09:24 Dose:  30 mg


Furosemide (Lasix)  20 mg IVPUSH NOW ONE


   Stop: 07/21/19 06:29


Lactated Ringer's (Ringers, Lactated)  1,000 mls @ 75 mls/hr IV ASDIRECTED [facility]


   Last Admin: 07/21/19 05:26 Dose:  125 mls/hr


Piperacillin Sod/Tazobactam (Sod 3.375 gm/ Sodium Chloride)  50 mls @ 100 mls/

hr IV Q6H [facility]


   Last Admin: 07/21/19 01:51 Dose:  100 mls/hr


Lactated Ringer's (Ringers, Lactated)  1,000 mls @ 125 mls/hr IV ASDIRECTED [facility]


Ketorolac Tromethamine (Toradol)  30 mg IVPUSH Q6H [facility]


   Stop: 07/24/19 04:01


   Last Admin: 07/21/19 03:49 Dose:  30 mg


Morphine Sulfate (Morphine)  2 mg IVPUSH Q1H PRN


   PRN Reason: Pain (severe 7-10)


   Last Admin: 07/19/19 05:59 Dose:  2 mg


Morphine Sulfate (Morphine Pca 30 Mg In 30 Ml)  0 mg IV ASDIRECTED [facility]; Protocol


Ondansetron HCl (Zofran)  4 mg IVPUSH Q6H PRN


   PRN Reason: Nausea/Vomiting


Sodium Chloride (Saline Flush)  10 ml FLUSH ASDIRECTED PRN


   PRN Reason: IV Use


   Last Admin: 07/20/19 14:37 Dose:  10 ml





Discontinued Medications





Sodium Chloride (Normal Saline)  250 mls @ 250 mls/hr IV ASDIRECTED [facility]


   Stop: 07/18/19 20:59


   Last Admin: 07/18/19 20:29 Dose:  250 mls/hr


Piperacillin Sod/Tazobactam (Sod 3.375 gm/ Sodium Chloride)  50 mls @ 100 mls/

hr IV Q6H [facility]


   Last Admin: 07/18/19 23:16 Dose:  Not Given


Ketorolac Tromethamine (Toradol)  30 mg IVPUSH Q6H [facility]


   Stop: 07/24/19 01:31


   Last Admin: 07/19/19 09:50 Dose:  30 mg


Piperacillin Sod/Tazobactam Sod (Zosyn) Confirm Administered Dose 3.375 gm 

.ROUTE .STK-MED ONE


   Stop: 07/18/19 20:13


   Last Admin: 07/18/19 20:18 Dose:  Not Given











- Exam


Wound/Incisions: Healing Well, No Drainage


General: Alert, Oriented


Lungs: Normal Respiratory Effort, Rales (in bases)


GI/Abdominal Exam: Soft, No Distention, Tender (mild tenderness near incision)


Extremities: Non-Tender





- Problem List Review


Problem List Initiated/Reviewed/Updated: Yes





- My Orders


Last 24 Hours: 


 Active Orders 24 hr











 Category Date Time Status


 


 RT Aerosol Therapy [RC] ASDIRECTED Care  07/20/19 09:09 Active


 


 Clear Liquid Diet [DIET] Diet  07/21/19 Breakfast Ordered


 


 Chest 2V [CR] Routine Exams  07/20/19 14:30 Taken


 


 Albuterol/Ipratropium [DuoNeb 3.0-0.5 MG/3 ML] Med  07/20/19 11:00 Active





 3 ml NEB QIDRT   


 


 Furosemide [Lasix] Med  07/21/19 06:28 Once





 20 mg IVPUSH NOW ONE   








 Medication Orders





Albuterol/Ipratropium (Duoneb 3.0-0.5 Mg/3 Ml)  3 ml NEB QIDRT [facility]


   Last Admin: 07/21/19 06:03  Dose: 3 ml


   Admin: 07/20/19 20:29  Dose: 3 ml


   Admin: 07/20/19 16:49  Dose: 3 ml


   Admin: 07/20/19 10:17  Dose: 3 ml


Enoxaparin Sodium (Lovenox)  30 mg SUBCUT DAILY [facility]


   Last Admin: 07/20/19 09:24  Dose: 30 mg


   Admin: 07/19/19 09:50  Dose: 30 mg


Furosemide (Lasix)  20 mg IVPUSH NOW ONE


   Stop: 07/21/19 06:29


Lactated Ringer's (Ringers, Lactated)  1,000 mls @ 75 mls/hr IV ASDIRECTED [facility]


   Last Admin: 07/21/19 05:26  Dose: 125 mls/hr


   Infusion: 07/21/19 05:15  Dose: 125 mls/hr


   Admin: 07/20/19 21:15  Dose: 125 mls/hr


   Infusion: 07/20/19 20:12  Dose: 125 mls/hr


   Admin: 07/20/19 12:12  Dose: 125 mls/hr


   Infusion: 07/20/19 11:45  Dose: 125 mls/hr


   Admin: 07/20/19 03:45  Dose: 125 mls/hr


   Infusion: 07/20/19 02:32  Dose: 125 mls/hr


   Admin: 07/19/19 18:32  Dose: 125 mls/hr


   Infusion: 07/19/19 17:44  Dose: 125 mls/hr


   Admin: 07/19/19 09:44  Dose: 125 mls/hr


   Infusion: 07/19/19 09:44  Dose: 125 mls/hr


   Admin: 07/19/19 02:43  Dose: 125 mls/hr


Piperacillin Sod/Tazobactam (Sod 3.375 gm/ Sodium Chloride)  50 mls @ 100 mls/

hr IV Q6H [facility]


   Last Admin: 07/21/19 01:51  Dose: 100 mls/hr


   Admin: 07/20/19 19:34  Dose: 100 mls/hr


   Admin: 07/20/19 13:56  Dose: 100 mls/hr


   Admin: 07/20/19 07:46  Dose: 100 mls/hr


   Admin: 07/20/19 01:27  Dose: 100 mls/hr


   Admin: 07/19/19 19:38  Dose: 100 mls/hr


   Admin: 07/19/19 14:12  Dose: 100 mls/hr


   Admin: 07/19/19 08:29  Dose: 100 mls/hr


   Admin: 07/19/19 01:51  Dose: 100 mls/hr


   Admin: 07/18/19 20:29  Dose: 100 mls/hr


Lactated Ringer's (Ringers, Lactated)  1,000 mls @ 125 mls/hr IV ASDIRECTED [facility]


Ketorolac Tromethamine (Toradol)  30 mg IVPUSH Q6H [facility]


   Stop: 07/24/19 04:01


   Last Admin: 07/21/19 03:49  Dose: 30 mg


   Admin: 07/20/19 21:18  Dose: 30 mg


   Admin: 07/20/19 16:42  Dose: 30 mg


   Admin: 07/20/19 10:09  Dose: 30 mg


   Admin: 07/20/19 03:46  Dose: 30 mg


   Admin: 07/19/19 23:06  Dose: 30 mg


   Admin: 07/19/19 16:45  Dose: 30 mg


Morphine Sulfate (Morphine)  2 mg IVPUSH Q1H PRN


   PRN Reason: Pain (severe 7-10)


   Last Admin: 07/19/19 05:59  Dose: 2 mg


Morphine Sulfate (Morphine Pca 30 Mg In 30 Ml)  0 mg IV ASDIRECTED [facility]; Protocol


Ondansetron HCl (Zofran)  4 mg IVPUSH Q6H PRN


   PRN Reason: Nausea/Vomiting


Sodium Chloride (Saline Flush)  10 ml FLUSH ASDIRECTED PRN


   PRN Reason: IV Use


   Last Admin: 07/20/19 14:37  Dose: 10 ml


   Admin: 07/20/19 07:45  Dose: 10 ml


   Admin: 07/19/19 16:51  Dose: 10 ml











- Assessment


Assessment           (Free Text/Narrative):: 





POD#3 repair of colon perforation


basilar rales  - atelectasis on xray - suspect early fluid overload


no flatus but some gas pain





- Plan


Plan                        (Free Text/Narrative):: 





Slow IV and give lasix


Continue breathing treatments and IS


Patient ambulating well


sips of clear liquids

## 2019-07-22 RX ADMIN — Medication PRN ML: at 07:48

## 2019-07-22 RX ADMIN — Medication PRN ML: at 08:18

## 2019-07-22 RX ADMIN — Medication PRN ML: at 14:30

## 2019-07-22 RX ADMIN — Medication PRN ML: at 14:01

## 2019-07-22 RX ADMIN — KETOROLAC TROMETHAMINE SCH MG: 30 INJECTION, SOLUTION INTRAMUSCULAR at 03:56

## 2019-07-22 RX ADMIN — Medication PRN ML: at 02:09

## 2019-07-22 NOTE — PCM.SURGPN
- General Info


Date of Service: 07/22/19


Date of Surgery/Procedure: 07/18/19


POD#: 4


Post-Op Diagnosis: Colon perforation


Functional Status: Reports: Pain Controlled





- Review of Systems


Pulmonary: Reports: No Symptoms, Other (right sided chest pain much improved, 

breathing much easier today after diuresis)


Gastrointestinal: Reports: Flatus, Other (passing multiple stools).  Denies: 

Nausea, Vomiting


Genitourinary: Reports: No Symptoms


Musculoskeletal: Denies: Leg Pain





- Patient Data


Vitals - Most Recent: 


 Last Vital Signs











Temp  99.0 F   07/21/19 23:51


 


Pulse  87   07/21/19 23:51


 


Resp  17   07/21/19 23:51


 


BP  141/84 H  07/21/19 23:51


 


Pulse Ox  93 L  07/21/19 23:51











Weight - Most Recent: 220 lb 8 oz


I&O - Last 24 Hours: 


 Intake & Output











 07/21/19 07/22/19 07/22/19





 22:59 06:59 14:59


 


Intake Total 967  


 


Output Total 675  


 


Balance 292  











Med Orders - Current: 


 Current Medications





Hydrocodone Bitart/Acetaminophen (Norco 325-5 Mg)  1 - 2 tab PO Q4H PRN


   PRN Reason: Pain


Albuterol/Ipratropium (Duoneb 3.0-0.5 Mg/3 Ml)  3 ml NEB QIDRT [facility]


   Last Admin: 07/22/19 06:00 Dose:  3 ml


Enoxaparin Sodium (Lovenox)  30 mg SUBCUT DAILY [facility]


   Last Admin: 07/22/19 08:19 Dose:  30 mg


Lactated Ringer's (Ringers, Lactated)  1,000 mls @ 75 mls/hr IV ASDIRECTED [facility]


   Last Admin: 07/21/19 19:10 Dose:  125 mls/hr


Piperacillin Sod/Tazobactam (Sod 3.375 gm/ Sodium Chloride)  50 mls @ 100 mls/

hr IV Q6H [facility]


   Last Admin: 07/22/19 07:50 Dose:  100 mls/hr


Lactated Ringer's (Ringers, Lactated)  1,000 mls @ 125 mls/hr IV ASDIRECTED [facility]


Morphine Sulfate (Morphine)  2 mg IVPUSH Q1H PRN


   PRN Reason: Pain (severe 7-10)


   Last Admin: 07/19/19 05:59 Dose:  2 mg


Morphine Sulfate (Morphine Pca 30 Mg In 30 Ml)  0 mg IV ASDIRECTED [facility]; Protocol


Ondansetron HCl (Zofran)  4 mg IVPUSH Q6H PRN


   PRN Reason: Nausea/Vomiting


Sodium Chloride (Saline Flush)  10 ml FLUSH ASDIRECTED PRN


   PRN Reason: IV Use


   Last Admin: 07/22/19 08:18 Dose:  10 ml





Discontinued Medications





Furosemide (Lasix)  20 mg IVPUSH NOW ONE


   Stop: 07/21/19 06:29


   Last Admin: 07/21/19 06:46 Dose:  20 mg


Sodium Chloride (Normal Saline)  250 mls @ 250 mls/hr IV ASDIRECTED [facility]


   Stop: 07/18/19 20:59


   Last Admin: 07/18/19 20:29 Dose:  250 mls/hr


Piperacillin Sod/Tazobactam (Sod 3.375 gm/ Sodium Chloride)  50 mls @ 100 mls/

hr IV Q6H [facility]


   Last Admin: 07/18/19 23:16 Dose:  Not Given


Ketorolac Tromethamine (Toradol)  30 mg IVPUSH Q6H [facility]


   Stop: 07/24/19 01:31


   Last Admin: 07/19/19 09:50 Dose:  30 mg


Ketorolac Tromethamine (Toradol)  30 mg IVPUSH Q6H [facility]


   Stop: 07/24/19 04:01


   Last Admin: 07/22/19 03:56 Dose:  30 mg


Piperacillin Sod/Tazobactam Sod (Zosyn) Confirm Administered Dose 3.375 gm 

.ROUTE .STK-MED ONE


   Stop: 07/18/19 20:13


   Last Admin: 07/18/19 20:18 Dose:  Not Given











- Exam


Wound/Incisions: Healing Well, No Drainage.  No: Erythema


General: Alert, Oriented


Lungs: Normal Respiratory Effort


GI/Abdominal Exam: Soft, No Distention


Extremities: Normal Inspection





- Problem List Review


Problem List Initiated/Reviewed/Updated: Yes





- My Orders


Last 24 Hours: 


 Active Orders 24 hr











 Category Date Time Status


 


 Full Liquid Diet [DIET] Diet  07/22/19 Breakfast Ordered


 


 Acetaminophen/HYDROcodone [Norco 325-5 MG] Med  07/21/19 21:03 Active





 1 - 2 tab PO Q4H PRN   


 


 Ketorolac [Toradol] Med  07/22/19 08:45 Ordered





 10 mg PO Q6H   


 


 Convert IV to Saline Lock [OM.PC] Routine Oth  07/22/19 08:46 Ordered








 Medication Orders





Hydrocodone Bitart/Acetaminophen (Norco 325-5 Mg)  1 - 2 tab PO Q4H PRN


   PRN Reason: Pain


Albuterol/Ipratropium (Duoneb 3.0-0.5 Mg/3 Ml)  3 ml NEB QIDRT [facility]


   Last Admin: 07/22/19 06:00  Dose: 3 ml


   Admin: 07/21/19 20:12  Dose: 3 ml


   Admin: 07/21/19 16:25  Dose: 3 ml


   Admin: 07/21/19 10:58  Dose: 3 ml


   Admin: 07/21/19 06:03  Dose: 3 ml


   Admin: 07/20/19 20:29  Dose: 3 ml


   Admin: 07/20/19 16:49  Dose: 3 ml


   Admin: 07/20/19 10:17  Dose: 3 ml


Enoxaparin Sodium (Lovenox)  30 mg SUBCUT DAILY [facility]


   Last Admin: 07/22/19 08:19  Dose: 30 mg


   Admin: 07/21/19 09:32  Dose: 30 mg


   Admin: 07/20/19 09:24  Dose: 30 mg


   Admin: 07/19/19 09:50  Dose: 30 mg


Lactated Ringer's (Ringers, Lactated)  1,000 mls @ 75 mls/hr IV ASDIRECTED [facility]


   Last Admin: 07/21/19 19:10  Dose: 125 mls/hr


   Infusion: 07/21/19 13:26  Dose: 125 mls/hr


   Admin: 07/21/19 05:26  Dose: 125 mls/hr


   Infusion: 07/21/19 05:15  Dose: 125 mls/hr


   Admin: 07/20/19 21:15  Dose: 125 mls/hr


   Infusion: 07/20/19 20:12  Dose: 125 mls/hr


   Admin: 07/20/19 12:12  Dose: 125 mls/hr


   Infusion: 07/20/19 11:45  Dose: 125 mls/hr


   Admin: 07/20/19 03:45  Dose: 125 mls/hr


   Infusion: 07/20/19 02:32  Dose: 125 mls/hr


   Admin: 07/19/19 18:32  Dose: 125 mls/hr


   Infusion: 07/19/19 17:44  Dose: 125 mls/hr


   Admin: 07/19/19 09:44  Dose: 125 mls/hr


   Infusion: 07/19/19 09:44  Dose: 125 mls/hr


   Admin: 07/19/19 02:43  Dose: 125 mls/hr


Piperacillin Sod/Tazobactam (Sod 3.375 gm/ Sodium Chloride)  50 mls @ 100 mls/

hr IV Q6H JENNIFER


   Last Admin: 07/22/19 07:50  Dose: 100 mls/hr


   Admin: 07/22/19 01:36  Dose: 100 mls/hr


   Admin: 07/21/19 19:42  Dose: 100 mls/hr


   Admin: 07/21/19 14:23  Dose: 100 mls/hr


   Admin: 07/21/19 07:35  Dose: 100 mls/hr


   Admin: 07/21/19 01:51  Dose: 100 mls/hr


   Admin: 07/20/19 19:34  Dose: 100 mls/hr


   Admin: 07/20/19 13:56  Dose: 100 mls/hr


   Admin: 07/20/19 07:46  Dose: 100 mls/hr


   Admin: 07/20/19 01:27  Dose: 100 mls/hr


   Admin: 07/19/19 19:38  Dose: 100 mls/hr


   Admin: 07/19/19 14:12  Dose: 100 mls/hr


   Admin: 07/19/19 08:29  Dose: 100 mls/hr


   Admin: 07/19/19 01:51  Dose: 100 mls/hr


   Admin: 07/18/19 20:29  Dose: 100 mls/hr


Lactated Ringer's (Ringers, Lactated)  1,000 mls @ 125 mls/hr IV ASDIRECTED JENNIFER


Morphine Sulfate (Morphine)  2 mg IVPUSH Q1H PRN


   PRN Reason: Pain (severe 7-10)


   Last Admin: 07/19/19 05:59  Dose: 2 mg


Morphine Sulfate (Morphine Pca 30 Mg In 30 Ml)  0 mg IV ASDIRECTED JENNIFER; Protocol


Ondansetron HCl (Zofran)  4 mg IVPUSH Q6H PRN


   PRN Reason: Nausea/Vomiting


Sodium Chloride (Saline Flush)  10 ml FLUSH ASDIRECTED PRN


   PRN Reason: IV Use


   Last Admin: 07/22/19 08:18  Dose: 10 ml


   Admin: 07/22/19 07:48  Dose: 10 ml


   Admin: 07/22/19 02:09  Dose: 10 ml


   Admin: 07/21/19 20:13  Dose: 10 ml


   Admin: 07/21/19 14:23  Dose: 10 ml


   Admin: 07/21/19 07:35  Dose: 10 ml


   Admin: 07/20/19 14:37  Dose: 10 ml


   Admin: 07/20/19 07:45  Dose: 10 ml


   Admin: 07/19/19 16:51  Dose: 10 ml











- Assessment


Assessment           (Free Text/Narrative):: 





POD # 4 repair colon perforation - improving


responded well to diuresis


GI function beginning to return and tolerating liquids





- Plan


Plan                        (Free Text/Narrative):: 





Advance to full liquids


saline lock IV

## 2019-07-23 NOTE — PCM.[id number]
- General Info


Date of Service: 07/23/19


Date of Surgery/Procedure: 07/18/19


POD#: 5


Post-Op Diagnosis: Colon Perforation


Functional Status: Reports: Pain Controlled (Not using anything other then 

Torodal)





- Review of Systems


Pulmonary: Denies: Shortness of Breath


Gastrointestinal: Reports: Flatus (passing stools).  Denies: Nausea, Vomiting


Genitourinary: Denies: Dysuria


Musculoskeletal: Reports: Other (right rib cage pain improving).  Denies: Leg 

Pain





- Patient Data


Vitals - Most Recent: 


 Last Vital Signs











Temp  98.5 F   07/22/19 23:30


 


Pulse  82   07/22/19 23:30


 


Resp  18   07/22/19 23:30


 


BP  126/81   07/22/19 23:30


 


Pulse Ox  96   07/22/19 23:30











Weight - Most Recent: 220 lb 8 oz


I&O - Last 24 Hours: 


 Intake & Output











 07/22/19 07/23/19 07/23/19





 22:59 06:59 14:59


 


Output Total 400 800 


 


Balance -400 -800 











Med Orders - Current: 


 Current Medications





Hydrocodone Bitart/Acetaminophen (Norco 325-5 Mg)  1 - 2 tab PO Q4H PRN


   PRN Reason: Pain


Albuterol/Ipratropium (Duoneb 3.0-0.5 Mg/3 Ml)  3 ml NEB QIDRT [facility]


   Last Admin: 07/23/19 06:09 Dose:  3 ml


Enoxaparin Sodium (Lovenox)  30 mg SUBCUT DAILY [facility]


   Last Admin: 07/22/19 08:19 Dose:  30 mg


Lactated Ringer's (Ringers, Lactated)  1,000 mls @ 75 mls/hr IV ASDIRECTED [facility]


   Last Admin: 07/21/19 19:10 Dose:  125 mls/hr


Lactated Ringer's (Ringers, Lactated)  1,000 mls @ 125 mls/hr IV ASDIRECTED [facility]


Ketorolac Tromethamine (Toradol)  10 mg PO Q6H [facility]


   Stop: 07/24/19 10:01


   Last Admin: 07/23/19 05:02 Dose:  10 mg


Morphine Sulfate (Morphine)  2 mg IVPUSH Q1H PRN


   PRN Reason: Pain (severe 7-10)


   Last Admin: 07/19/19 05:59 Dose:  2 mg


Morphine Sulfate (Morphine Pca 30 Mg In 30 Ml)  0 mg IV ASDIRECTED [facility]; Protocol


Ondansetron HCl (Zofran)  4 mg IVPUSH Q6H PRN


   PRN Reason: Nausea/Vomiting


Sodium Chloride (Saline Flush)  10 ml FLUSH ASDIRECTED PRN


   PRN Reason: IV Use


   Last Admin: 07/22/19 14:30 Dose:  10 ml





Discontinued Medications





Furosemide (Lasix)  20 mg IVPUSH NOW ONE


   Stop: 07/21/19 06:29


   Last Admin: 07/21/19 06:46 Dose:  20 mg


Piperacillin Sod/Tazobactam (Sod 3.375 gm/ Sodium Chloride)  50 mls @ 100 mls/

hr IV Q6H [facility]


   Last Admin: 07/22/19 14:01 Dose:  100 mls/hr


Sodium Chloride (Normal Saline)  250 mls @ 250 mls/hr IV ASDIRECTED [facility]


   Stop: 07/18/19 20:59


   Last Admin: 07/18/19 20:29 Dose:  250 mls/hr


Piperacillin Sod/Tazobactam (Sod 3.375 gm/ Sodium Chloride)  50 mls @ 100 mls/

hr IV Q6H [facility]


   Last Admin: 07/18/19 23:16 Dose:  Not Given


Ketorolac Tromethamine (Toradol)  30 mg IVPUSH Q6H [facility]


   Stop: 07/24/19 01:31


   Last Admin: 07/19/19 09:50 Dose:  30 mg


Ketorolac Tromethamine (Toradol)  30 mg IVPUSH Q6H [facility]


   Stop: 07/24/19 04:01


   Last Admin: 07/22/19 03:56 Dose:  30 mg


Piperacillin Sod/Tazobactam Sod (Zosyn) Confirm Administered Dose 3.375 gm 

.ROUTE .STK-MED ONE


   Stop: 07/18/19 20:13


   Last Admin: 07/18/19 20:18 Dose:  Not Given











- Exam


Wound/Incisions: Healing Well


General: Alert, Oriented


Lungs: Normal Respiratory Effort


Extremities: Normal Inspection





- Problem List Review


Problem List Initiated/Reviewed/Updated: Yes





- My Orders


Last 24 Hours: 


 Active Orders 24 hr











 Category Date Time Status


 


 Ready for Discharge [RC] PER UNIT ROUTINE Care  07/23/19 07:31 Ordered


 


 Ketorolac [Toradol] Med  07/22/19 10:00 Active





 10 mg PO Q6H   


 


 Convert IV to Saline Lock [OM.PC] Routine Oth  07/22/19 08:46 Ordered








 Medication Orders





Hydrocodone Bitart/Acetaminophen (Norco 325-5 Mg)  1 - 2 tab PO Q4H PRN


   PRN Reason: Pain


Albuterol/Ipratropium (Duoneb 3.0-0.5 Mg/3 Ml)  3 ml NEB QIDRT [facility]


   Last Admin: 07/23/19 06:09  Dose: 3 ml


   Admin: 07/22/19 21:32  Dose: 3 ml


   Admin: 07/22/19 16:19  Dose: 3 ml


   Admin: 07/22/19 10:00  Dose: 3 ml


   Admin: 07/22/19 06:00  Dose: 3 ml


   Admin: 07/21/19 20:12  Dose: 3 ml


   Admin: 07/21/19 16:25  Dose: 3 ml


   Admin: 07/21/19 10:58  Dose: 3 ml


   Admin: 07/21/19 06:03  Dose: 3 ml


   Admin: 07/20/19 20:29  Dose: 3 ml


   Admin: 07/20/19 16:49  Dose: 3 ml


   Admin: 07/20/19 10:17  Dose: 3 ml


Enoxaparin Sodium (Lovenox)  30 mg SUBCUT DAILY [facility]


   Last Admin: 07/22/19 08:19  Dose: 30 mg


   Admin: 07/21/19 09:32  Dose: 30 mg


   Admin: 07/20/19 09:24  Dose: 30 mg


   Admin: 07/19/19 09:50  Dose: 30 mg


Lactated Ringer's (Ringers, Lactated)  1,000 mls @ 75 mls/hr IV ASDIRECTED [facility]


   Last Admin: 07/21/19 19:10  Dose: 125 mls/hr


   Infusion: 07/21/19 13:26  Dose: 125 mls/hr


   Admin: 07/21/19 05:26  Dose: 125 mls/hr


   Infusion: 07/21/19 05:15  Dose: 125 mls/hr


   Admin: 07/20/19 21:15  Dose: 125 mls/hr


   Infusion: 07/20/19 20:12  Dose: 125 mls/hr


   Admin: 07/20/19 12:12  Dose: 125 mls/hr


   Infusion: 07/20/19 11:45  Dose: 125 mls/hr


   Admin: 07/20/19 03:45  Dose: 125 mls/hr


   Infusion: 07/20/19 02:32  Dose: 125 mls/hr


   Admin: 07/19/19 18:32  Dose: 125 mls/hr


   Infusion: 07/19/19 17:44  Dose: 125 mls/hr


   Admin: 07/19/19 09:44  Dose: 125 mls/hr


   Infusion: 07/19/19 09:44  Dose: 125 mls/hr


   Admin: 07/19/19 02:43  Dose: 125 mls/hr


Lactated Ringer's (Ringers, Lactated)  1,000 mls @ 125 mls/hr IV ASDIRECTED [facility]


Ketorolac Tromethamine (Toradol)  10 mg PO Q6H [facility]


   Stop: 07/24/19 10:01


   Last Admin: 07/23/19 05:02  Dose: 10 mg


   Admin: 07/22/19 21:33  Dose: 10 mg


   Admin: 07/22/19 16:18  Dose: 10 mg


   Admin: 07/22/19 09:54  Dose: 10 mg


Morphine Sulfate (Morphine)  2 mg IVPUSH Q1H PRN


   PRN Reason: Pain (severe 7-10)


   Last Admin: 07/19/19 05:59  Dose: 2 mg


Morphine Sulfate (Morphine Pca 30 Mg In 30 Ml)  0 mg IV ASDIRECTED [facility]; Protocol


Ondansetron HCl (Zofran)  4 mg IVPUSH Q6H PRN


   PRN Reason: Nausea/Vomiting


Sodium Chloride (Saline Flush)  10 ml FLUSH ASDIRECTED PRN


   PRN Reason: IV Use


   Last Admin: 07/22/19 14:30  Dose: 10 ml


   Admin: 07/22/19 14:01  Dose: 10 ml


   Admin: 07/22/19 08:18  Dose: 10 ml


   Admin: 07/22/19 07:48  Dose: 10 ml


   Admin: 07/22/19 02:09  Dose: 10 ml


   Admin: 07/21/19 20:13  Dose: 10 ml


   Admin: 07/21/19 14:23  Dose: 10 ml


   Admin: 07/21/19 07:35  Dose: 10 ml


   Admin: 07/20/19 14:37  Dose: 10 ml


   Admin: 07/20/19 07:45  Dose: 10 ml


   Admin: 07/19/19 16:51  Dose: 10 ml











- Assessment


Assessment           (Free Text/Narrative):: 





POD#5 repair colon perforation  - doing well





- Plan


Plan                        (Free Text/Narrative):: 





Discharge


Will have staples removed in [facility] in 3 days


No heavy lifting for 5 more weeks


Advance diet as tolerated


Advised to repeat colonoscopy in 3 years

## 2019-07-30 NOTE — DISCH
DISCHARGE DATE:  07/23/2019

 

DISCHARGE DIAGNOSIS:  Cecal perforation secondary to polypectomy during

colonoscopy.

Post op Atelectasis

 

OPERATION PERFORMED:  Exploratory laparotomy with closure of cecal perforation

on 07/18/2019.

 

On 07/18/2019, this patient underwent a screening colonoscopy at another

facility.  A single polypectomy was performed at that time of a large cecal

polyp.  Post-procedure, the patient was noted to have some nausea and abdominal

pain, and a postprocedure abdominal x-ray revealed free intraperitoneal air

indicating a perforation at the polypectomy site.  She was transferred to this

facility for definitive care.

 

HOSPITAL COURSE:  Upon admission, the patient was taken to the operating room,

where exploratory laparotomy was performed.  A small perforation in the cecum at

the site of the polypectomy was easily identified and this perforation was

closed.  There was minimal contamination of the abdominal cavity and no other

evidence of complication.  Postoperatively, the patient was kept on appropriate

perioperative antibiotics and initially was kept n.p.o.  She was noted to have

low volumes on her incentive spirometry as well as slightly decreased O2

saturations.  A chest x-ray and clinical findings were suggestive of

atelectasis, and the patient was placed on SVN treatments, and with this

treatment as well as increased activity and a low-dose Lasix to improve

diuresis, her pulmonary symptoms resolved.  By the 3rd day after surgery, she

had begun to have some signs of bowel function with passage of flatus and stool,

and was started on a liquid diet, which she was able to readily advance up to a

soft diet by the time of discharge.  She did have a low-grade fever secondary to

her atelectasis, but there was no evidence of any wound complication or other

signs of infection.  By the 5th day after surgery, she was ambulatory,

tolerating a light diet, had no respiratory symptoms, and was stable for

discharge.  Her wound was healing well at that time.  She is discharged to home

to continue on her regular medications, and will be seen in the outpatient

clinic for staple removal in 3 days.  Final

pathology report on the polyp that was removed, showed it to be an adenomatous-

type of polyp, and she was advised to have a repeat colonoscopy in 3 years.

 

Job#: [id number]

DD: 07/29/2019 0902

DT: 07/30/2019 0347 JAX/RON MCCOY

## 2022-09-15 ENCOUNTER — HOSPITAL ENCOUNTER (OUTPATIENT)
Dept: HOSPITAL 52 - LL.SDS | Age: 52
Discharge: HOME | End: 2022-09-15
Attending: SURGERY
Payer: COMMERCIAL

## 2022-09-15 DIAGNOSIS — K21.9: ICD-10-CM

## 2022-09-15 DIAGNOSIS — E78.5: ICD-10-CM

## 2022-09-15 DIAGNOSIS — Z12.11: Primary | ICD-10-CM

## 2022-09-15 DIAGNOSIS — Z79.899: ICD-10-CM

## 2022-09-15 DIAGNOSIS — G47.33: ICD-10-CM

## 2022-09-15 DIAGNOSIS — N18.30: ICD-10-CM

## 2022-09-15 DIAGNOSIS — E11.22: ICD-10-CM

## 2022-09-15 DIAGNOSIS — Z86.010: ICD-10-CM

## 2022-09-15 DIAGNOSIS — E03.8: ICD-10-CM

## 2022-09-15 DIAGNOSIS — Z79.890: ICD-10-CM

## 2022-09-15 DIAGNOSIS — E66.9: ICD-10-CM
